# Patient Record
Sex: MALE | Race: WHITE | URBAN - METROPOLITAN AREA
[De-identification: names, ages, dates, MRNs, and addresses within clinical notes are randomized per-mention and may not be internally consistent; named-entity substitution may affect disease eponyms.]

---

## 2017-03-07 ENCOUNTER — OFFICE VISIT (OUTPATIENT)
Dept: FAMILY MEDICINE CLINIC | Age: 52
End: 2017-03-07

## 2017-03-07 VITALS
OXYGEN SATURATION: 98 % | SYSTOLIC BLOOD PRESSURE: 146 MMHG | WEIGHT: 191.8 LBS | HEART RATE: 79 BPM | TEMPERATURE: 97.5 F | RESPIRATION RATE: 16 BRPM | DIASTOLIC BLOOD PRESSURE: 85 MMHG | BODY MASS INDEX: 27.46 KG/M2 | HEIGHT: 70 IN

## 2017-03-07 DIAGNOSIS — R07.89 CHEST WALL PAIN: Primary | ICD-10-CM

## 2017-03-07 PROBLEM — K63.5 COLON POLYPS: Status: ACTIVE | Noted: 2017-03-07

## 2017-03-07 PROBLEM — E78.5 HYPERLIPEMIA: Status: ACTIVE | Noted: 2017-03-07

## 2017-03-07 PROBLEM — K21.9 ACID REFLUX: Status: ACTIVE | Noted: 2017-03-07

## 2017-03-07 PROBLEM — K50.90 CROHN DISEASE (HCC): Status: ACTIVE | Noted: 2017-03-07

## 2017-03-07 PROBLEM — K58.9 IBS (IRRITABLE BOWEL SYNDROME): Status: ACTIVE | Noted: 2017-03-07

## 2017-03-07 RX ORDER — DIAZEPAM 10 MG/1
30 TABLET ORAL
COMMUNITY

## 2017-03-07 RX ORDER — NAPROXEN 250 MG/1
220 TABLET ORAL 2 TIMES DAILY WITH MEALS
COMMUNITY
End: 2017-03-07

## 2017-03-07 RX ORDER — DIPHENOXYLATE HYDROCHLORIDE AND ATROPINE SULFATE 2.5; .025 MG/1; MG/1
TABLET ORAL
COMMUNITY

## 2017-03-07 RX ORDER — MELOXICAM 15 MG/1
15 TABLET ORAL
Qty: 15 TAB | Refills: 0 | Status: SHIPPED | OUTPATIENT
Start: 2017-03-07

## 2017-03-07 RX ORDER — CARISOPRODOL 250 MG/1
250 TABLET ORAL 4 TIMES DAILY
COMMUNITY

## 2017-03-07 RX ORDER — DIPHENHYDRAMINE HCL 25 MG
25 CAPSULE ORAL
COMMUNITY

## 2017-03-07 NOTE — PROGRESS NOTES
HISTORY OF PRESENT ILLNESS  Saul Allen is a 45 y.o. male. HPI  C/o chest pain, started 2 months ago after lifting some weights, the pain is only when he use his arms, 4/10,  no pain at rest, no radiation to the arms or the neck    Pt is seen by other MD's in [place] for his chronic problems, he is visiting here for few months  Review of Systems   Constitutional: Negative for chills and fever. Respiratory: Negative for cough, shortness of breath and wheezing. Cardiovascular: Negative for palpitations, orthopnea and leg swelling. Gastrointestinal: Negative for abdominal pain, heartburn, nausea and vomiting. Skin: Negative for itching and rash. Neurological: Negative for headaches. Physical Exam   Constitutional: He is oriented to person, place, and time. He appears well-developed and well-nourished. Cardiovascular: Normal rate, regular rhythm and normal heart sounds. Pulmonary/Chest: Effort normal and breath sounds normal. He has no wheezes. He has no rales. He exhibits tenderness (left side mid chest wall at the costochondral joints). Abdominal: Soft. There is no hepatosplenomegaly. There is no tenderness. Neurological: He is alert and oriented to person, place, and time. Skin: No rash noted. Vitals reviewed. ASSESSMENT and PLAN  Dean Poe was seen today for chest pain. Diagnoses and all orders for this visit:    Chest wall pain  -     XR CHEST PA LAT; Future  -     meloxicam (MOBIC) 15 mg tablet; Take 1 Tab by mouth daily as needed for Pain.     Follow up if no better in 2 weeks

## 2017-03-07 NOTE — MR AVS SNAPSHOT
Visit Information Date & Time Provider Department Dept. Phone Encounter #  
 3/7/2017  2:30 PM Cyn De Paz, 3 [facility] [phone] Follow-up Instructions Return if symptoms worsen or fail to improve. Upcoming Health Maintenance Date Due Hepatitis C Screening 1965 DTaP/Tdap/Td series (1 - Tdap) 4/30/1985 FOBT Q 1 YEAR AGE 50-75 4/30/2014 INFLUENZA AGE 9 TO ADULT 8/1/2016 Allergies as of 3/7/2017  Review Complete On: 3/7/2017 By: Cyn De Paz MD  
 No Known Allergies Current Immunizations  Never Reviewed Name Date Influenza Vaccine 11/1/2016 Tdap 12/9/2010 Not reviewed this visit You Were Diagnosed With   
  
 Codes Comments Chest wall pain    -  Primary ICD-10-CM: R07.89 ICD-9-CM: 786.52 Vitals BP Pulse Temp Resp Height(growth percentile) Weight(growth percentile) 146/85 79 97.5 °F (36.4 °C) (Oral) 16 5' 10\" (1.778 m) 191 lb 12.8 oz (87 kg) SpO2 BMI Smoking Status 98% 27.52 kg/m2 Former Smoker Vitals History BMI and BSA Data Body Mass Index Body Surface Area  
 27.52 kg/m 2 2.07 m 2 Your Updated Medication List  
  
   
This list is accurate as of: 3/7/17  2:59 PM.  Always use your most recent med list.  
  
  
  
  
 BENADRYL 25 mg capsule Generic drug:  diphenhydrAMINE Take 25 mg by mouth every six (6) hours as needed. carisoprodol 250 mg tablet Commonly known as:  SOMA Take 250 mg by mouth four (4) times daily. diazePAM 10 mg tablet Commonly known as:  VALIUM Take 30 mg by mouth nightly. diphenoxylate-atropine 2.5-0.025 mg per tablet Commonly known as:  LOMOTIL Take  by mouth four (4) times daily as needed for Diarrhea.  
  
 meloxicam 15 mg tablet Commonly known as:  MOBIC Take 1 Tab by mouth daily as needed for Pain. Prescriptions Printed Refills meloxicam (MOBIC) 15 mg tablet 0 Sig: Take 1 Tab by mouth daily as needed for Pain. Class: Print Route: Oral  
  
Follow-up Instructions Return if symptoms worsen or fail to improve. To-Do List   
 03/07/2017 Imaging:  XR CHEST PA LAT Introducing [facility]! Curly Pacheco introduces [employer] patient portal. Now you can access parts of your medical record, email your doctor's office, and request medication refills online. 1. In your internet browser, go to [email or web address] 2. Click on the First Time User? Click Here link in the Sign In box. You will see the New Member Sign Up page. 3. Enter your [employer] Access Code exactly as it appears below. You will not need to use this code after youve completed the sign-up process. If you do not sign up before the expiration date, you must request a new code. · [employer] Access Code: [email or web address] Expires: 6/5/2017  2:59 PM 
 
4. Enter the last four digits of your Social Security Number (xxxx) and Date of Birth (mm/dd/yyyy) as indicated and click Submit. You will be taken to the next sign-up page. 5. Create a [employer] ID. This will be your [employer] login ID and cannot be changed, so think of one that is secure and easy to remember. 6. Create a [employer] password. You can change your password at any time. 7. Enter your Password Reset Question and Answer. This can be used at a later time if you forget your password. 8. Enter your e-mail address. You will receive e-mail notification when new information is available in [address] 9. Click Sign Up. You can now view and download portions of your medical record. 10. Click the Download Summary menu link to download a portable copy of your medical information. If you have questions, please visit the Frequently Asked Questions section of the [employer] website. Remember, [employer] is NOT to be used for urgent needs. For medical emergencies, dial 911. Now available from your iPhone and Android! Please provide this summary of care documentation to your next provider. If you have any questions after today's visit, please call [phone].

## 2017-03-07 NOTE — PROGRESS NOTES
Robin Noriega is a 45 y.o. male   Pt here today for abdomen pain, and to establish care. 1. Have you been to the ER, urgent care clinic since your last visit? Hospitalized since your last visit? No    2. Have you seen or consulted any other health care providers outside of the [address] [place] [address] since your last visit? Include any pap smears or colon screening.  No